# Patient Record
Sex: FEMALE | Race: BLACK OR AFRICAN AMERICAN
[De-identification: names, ages, dates, MRNs, and addresses within clinical notes are randomized per-mention and may not be internally consistent; named-entity substitution may affect disease eponyms.]

---

## 2019-08-12 ENCOUNTER — HOSPITAL ENCOUNTER (EMERGENCY)
Dept: HOSPITAL 62 - ER | Age: 84
Discharge: HOME | End: 2019-08-12
Payer: MEDICARE

## 2019-08-12 VITALS — DIASTOLIC BLOOD PRESSURE: 84 MMHG | SYSTOLIC BLOOD PRESSURE: 189 MMHG

## 2019-08-12 DIAGNOSIS — Z79.899: ICD-10-CM

## 2019-08-12 DIAGNOSIS — Z87.891: ICD-10-CM

## 2019-08-12 DIAGNOSIS — I10: ICD-10-CM

## 2019-08-12 DIAGNOSIS — M54.32: Primary | ICD-10-CM

## 2019-08-12 DIAGNOSIS — M62.81: ICD-10-CM

## 2019-08-12 DIAGNOSIS — Z79.82: ICD-10-CM

## 2019-08-12 LAB
%HYPO/RBC NFR BLD AUTO: (no result) %
ABSOLUTE LYMPHOCYTES# (MANUAL): 1.4 10^3/UL (ref 0.5–4.7)
ABSOLUTE MONOCYTES # (MANUAL): 0.1 10^3/UL (ref 0.1–1.4)
ADD MANUAL DIFF: YES
ALBUMIN SERPL-MCNC: 4.2 G/DL (ref 3.5–5)
ALP SERPL-CCNC: 82 U/L (ref 38–126)
ANION GAP SERPL CALC-SCNC: 10 MMOL/L (ref 5–19)
ANISOCYTOSIS BLD QL SMEAR: (no result)
APPEARANCE UR: CLEAR
APTT BLD: 31.5 SEC (ref 23.5–35.8)
APTT PPP: YELLOW S
AST SERPL-CCNC: 28 U/L (ref 14–36)
BASOPHILS NFR BLD MANUAL: 0 % (ref 0–2)
BILIRUB DIRECT SERPL-MCNC: 0.2 MG/DL (ref 0–0.4)
BILIRUB SERPL-MCNC: 0.6 MG/DL (ref 0.2–1.3)
BILIRUB UR QL STRIP: NEGATIVE
BUN SERPL-MCNC: 13 MG/DL (ref 7–20)
BURR CELLS BLD QL SMEAR: SLIGHT
CALCIUM: 9.5 MG/DL (ref 8.4–10.2)
CHLORIDE SERPL-SCNC: 109 MMOL/L (ref 98–107)
CK MB SERPL-MCNC: 2.66 NG/ML (ref ?–4.55)
CK SERPL-CCNC: 159 U/L (ref 30–135)
CO2 SERPL-SCNC: 24 MMOL/L (ref 22–30)
EOSINOPHIL NFR BLD MANUAL: 5 % (ref 0–6)
ERYTHROCYTE [DISTWIDTH] IN BLOOD BY AUTOMATED COUNT: 16.7 % (ref 11.5–14)
FREE T4 (FREE THYROXINE): 1.51 NG/DL (ref 0.78–2.19)
GLUCOSE SERPL-MCNC: 125 MG/DL (ref 75–110)
GLUCOSE UR STRIP-MCNC: >=500 MG/DL
HCT VFR BLD CALC: 37.3 % (ref 36–47)
HGB BLD-MCNC: 12.1 G/DL (ref 12–15.5)
INR PPP: 1.08
KETONES UR STRIP-MCNC: 20 MG/DL
MCH RBC QN AUTO: 23.7 PG (ref 27–33.4)
MCHC RBC AUTO-ENTMCNC: 32.6 G/DL (ref 32–36)
MCV RBC AUTO: 73 FL (ref 80–97)
MONOCYTES % (MANUAL): 3 % (ref 3–13)
NITRITE UR QL STRIP: NEGATIVE
NT PRO BNP: 418 PG/ML (ref ?–450)
OVALOCYTES BLD QL SMEAR: (no result)
PH UR STRIP: 5 [PH] (ref 5–9)
PLATELET # BLD: 174 10^3/UL (ref 150–450)
PLATELET COMMENT: ADEQUATE
POIKILOCYTOSIS BLD QL SMEAR: (no result)
POTASSIUM SERPL-SCNC: 3.7 MMOL/L (ref 3.6–5)
PROT SERPL-MCNC: 7.8 G/DL (ref 6.3–8.2)
PROT UR STRIP-MCNC: 100 MG/DL
PROTHROMBIN TIME: 14 SEC (ref 11.4–15.4)
RBC # BLD AUTO: 5.13 10^6/UL (ref 3.72–5.28)
SCHISTOCYTES BLD QL SMEAR: (no result)
SEGMENTED NEUTROPHILS % (MAN): 57 % (ref 42–78)
SP GR UR STRIP: 1.03
TOTAL CELLS COUNTED BLD: 100
TROPONIN I SERPL-MCNC: < 0.012 NG/ML
TSH SERPL-ACNC: 0.57 UIU/ML (ref 0.47–4.68)
UROBILINOGEN UR-MCNC: NEGATIVE MG/DL (ref ?–2)
VARIANT LYMPHS NFR BLD MANUAL: 34 % (ref 13–45)
WBC # BLD AUTO: 4.1 10^3/UL (ref 4–10.5)

## 2019-08-12 PROCEDURE — 83880 ASSAY OF NATRIURETIC PEPTIDE: CPT

## 2019-08-12 PROCEDURE — 70450 CT HEAD/BRAIN W/O DYE: CPT

## 2019-08-12 PROCEDURE — 82553 CREATINE MB FRACTION: CPT

## 2019-08-12 PROCEDURE — 85730 THROMBOPLASTIN TIME PARTIAL: CPT

## 2019-08-12 PROCEDURE — 99284 EMERGENCY DEPT VISIT MOD MDM: CPT

## 2019-08-12 PROCEDURE — 85025 COMPLETE CBC W/AUTO DIFF WBC: CPT

## 2019-08-12 PROCEDURE — 80053 COMPREHEN METABOLIC PANEL: CPT

## 2019-08-12 PROCEDURE — 85610 PROTHROMBIN TIME: CPT

## 2019-08-12 PROCEDURE — 82550 ASSAY OF CK (CPK): CPT

## 2019-08-12 PROCEDURE — 96360 HYDRATION IV INFUSION INIT: CPT

## 2019-08-12 PROCEDURE — 84443 ASSAY THYROID STIM HORMONE: CPT

## 2019-08-12 PROCEDURE — 93005 ELECTROCARDIOGRAM TRACING: CPT

## 2019-08-12 PROCEDURE — 71045 X-RAY EXAM CHEST 1 VIEW: CPT

## 2019-08-12 PROCEDURE — 81001 URINALYSIS AUTO W/SCOPE: CPT

## 2019-08-12 PROCEDURE — 84439 ASSAY OF FREE THYROXINE: CPT

## 2019-08-12 PROCEDURE — 84484 ASSAY OF TROPONIN QUANT: CPT

## 2019-08-12 PROCEDURE — 93010 ELECTROCARDIOGRAM REPORT: CPT

## 2019-08-12 PROCEDURE — 82962 GLUCOSE BLOOD TEST: CPT

## 2019-08-12 PROCEDURE — 72131 CT LUMBAR SPINE W/O DYE: CPT

## 2019-08-12 PROCEDURE — 83735 ASSAY OF MAGNESIUM: CPT

## 2019-08-12 PROCEDURE — 36415 COLL VENOUS BLD VENIPUNCTURE: CPT

## 2019-08-12 NOTE — RADIOLOGY REPORT (SQ)
EXAM DESCRIPTION:  CT HEAD WITHOUT



COMPLETED DATE/TIME:  8/12/2019 11:01 am



REASON FOR STUDY:  left sided weakness, ams



COMPARISON:  None.



TECHNIQUE:  Axial images acquired through the brain without intravenous contrast.  Images reviewed wi
th bone, brain and subdural windows.  Additional sagittal and coronal reconstructions were generated.
 Images stored on PACS.

All CT scanners at this facility use dose modulation, iterative reconstruction, and/or weight based d
osing when appropriate to reduce radiation dose to as low as reasonably achievable (ALARA).

[facility]: Dose Right  [facility]: CareDose    [facility]: Dose Right    [facility]: Teradose 4D    [facility]: Smart Technologies



RADIATION DOSE:  CT Rad equipment meets quality standard of care and radiation dose reduction techniq
ues were employed. CTDIvol: 53.2 mGy. DLP: 1097 mGy-cm. mGy.



LIMITATIONS:  None.



FINDINGS:  VENTRICLES: Prominent ventricles secondary to involutional atrophy.

CEREBRUM: Mild cortical atrophy.  No masses.  No hemorrhage.  No midline shift.  No evidence for acut
e infarction. Areas of low density in the white matter most likely chronic small vessel ischemic guy
ges.

CEREBELLUM: No masses.  No hemorrhage.  No alteration of density.  No evidence for acute infarction.

EXTRAAXIAL SPACES: No fluid collections.  No masses.

ORBITS AND GLOBE: No intra- or extraconal masses.  Normal contour of globe without masses.

CALVARIUM: No fracture.

PARANASAL SINUSES: No fluid or mucosal thickening.

SOFT TISSUES: No mass or hematoma.

OTHER: No other significant finding.



IMPRESSION:  Involutional changes with chronic microvascular ischemia.  No acute intracranial imaging
 findings.

EVIDENCE OF ACUTE STROKE: NO.



COMMENT:  Quality ID # 436: Final reports with documentation of one or more dose reduction techniques
 (e.g., Automated exposure control, adjustment of the mA and/or kV according to patient size, use of 
iterative reconstruction technique)



TECHNICAL DOCUMENTATION:  JOB ID:  [id number]

 2011 [employer]- All Rights Reserved



Reading location - IP/workstation name: [email or web address]

## 2019-08-12 NOTE — ER DOCUMENT REPORT
ED Medical Screen (RME)





- General


Chief Complaint: Weakness


Stated Complaint: POSSIBLE STROKE


Time Seen by Provider: 19 09:37


Primary Care Provider: 


MARY DANIELLE MD [Primary Care Provider] - Follow up as needed


Notes: 





HPI: 89-year-old female brought in by her daughters for concern of left-sided 

weakness since  morning, approximately 24 hours ago was her last known 

well time.  He states she had a right-sided lean when she was sitting in her 

chair and she appeared to have some facial droop where she could not drink 

liquids appropriately and it was coming out of her mouth yesterday around 2pm.  

They also state her left leg appears heavy and she is not walking normally and 

walks like she is going to fall over.  No history of this before.  She has not 

been compliant with all of her medications to include her thyroid medication.  

She denies pain anywhere.  No fall or trauma.  She does take a baby aspirin 

daily. no other blood thinners. No prior history of MI or CVA per patient and 

daughter at bedside.  She also has some urinary frequency. no other uti sx. She 

is a diabetic and states her sugars have been running high and her sugar was 340

yesterday when they checked it before giving her insulin.  No other changes in 

medication or diet.  she has not sought care until now.  No other complaints at 

this time. pcp was dr marquez before he . she hasn't really f/u since. 





ROS neg to include 10 systems, unless mentioned in the hpi.





PE:>>>> PHYSICAL_EXAM:


   GENERAL_APPEARANCE: well_nourished, alert, cooperative, no_acute_distress, 

no_obvious_discomfort. pleasant, elderly black female, smiling, speaking in full

sentences, in no sign of pain or resp distress, two adult female daughter at 

bedside 


  VITALS: reviewed, see vital signs table.


  HEAD: no_swelling\tenderness on the head. normocephalic. atraumatic. no evans

signs. no raccoons eyes. 


  EYES: PERRL, EOMI, conjunctiva_clear.


  NOSE: no_nasal_discharge.


  MOUTH: (-)decreased moisture.


  THROAT: no_tonsilar_inflammation, no_airway_obstruction. no_lymphadenopathy. 

no drooling, tripoding, voice change, or stridor. 


  NECK: supple, no_neck_tenderness,  full rom. full strength.  no meningeal 

signs. 


  BACK: no_back_tenderness.


  CHEST_WALL: no_chest_tenderness. no overlying skin changes 


  LUNGS: no_wheezing, ctab  (-)accessory muscle use, good air exchange 

bilateral.


  HEART: normal_rate, normal_rhythm, 


  ABDOMEN: normal_BS, soft, no_abd_tenderness, (-)guarding, (-)rebound, no 

distension or peritoneal signs. no cva ttp


  EXTREMITIES: strength 5/5 in right upper and lower _extremities there is 

slight decreased strength in LUE and LLE, good pulses in all_extremities, 

no_tenderness in the extremities, almost 1+ pitting_edema in lower extremities 

bilat. full rom. gait not assessed. good pulses. brisk cap refill. good hand 

. neg ceasar sign


NEURO: motor and sensation intact, pt has a left facial droop, asymmetrical 

smile. symmetric forehead raise. slight decreased eyelid strength on the left. 

slightly pos pronator drift on the left. 


  SKIN: warm, dry, good_color, no_rash.


  MENTAL_STATUS: speech_clear, normal_affect, responds_appropriately to 

questions.








MDM:





I have ordered labs and initial work-up and patient will be transferred to the 

main ER for further work-up.


 


I have greeted and performed a rapid initial assessment of this patient.  A 

comprehensive ED assessment and evaluation of the patient, analysis of test 

results and completion of medical decision making process will be conducted by 

an additional ED providers.





 Documentation achieved through voice recording which my lead to some occasional

accidental typographical errors. Extensive efforts have been made to proof read 

documentation to make sure these are the least as possible








                                        











  Temp Pulse Resp BP Pulse Ox


 


 19 09:05  98.1 F  84  18  182/82 H  97











                                        











 Category Date Time Status


 


 Accucheck (ED) NOW Care  19 09:49 Ordered


 


 ED Nursing Stroke Protocol NOW Care  19 09:50 Ordered


 


 EKG Documentation STAT Care  19 09:48 Ordered


 


 EKG Documentation STAT Care  19 09:48 Ordered


 


 Head of Bed >30 Degrees (ED) NOW Care  19 09:50 Ordered


 


 MEND Neuro Exam STAT,Q3 Care  19 09:50 Ordered


 


 Monitoring [Continuous Cardiac Monitoring (ED)] NOW Care  19 09:49 

Ordered


 


 Oxygen (ED) Nasal Cannula 2 lpm Care  19 09:50 Ordered


 


 PCT AccuChek Documentation NOW Care  19 09:49 Ordered


 


 Pulse Oximeter Continuous (ED) CONTINUOUS Care  19 09:50 Ordered


 


 Saline Lock (ED) NOW Care  19 09:50 Ordered


 


 CHEST 2 VIEWS [RAD] Stat Exams  19 09:49 Ordered


 


 CT HEAD WITHOUT [CT] Stat Exams  19 09:49 Ordered


 


 CBC WITH DIFF [HEME] Stat Lab  19 09:47 Ordered


 


 COMPREHENSIVE METABOLIC PANEL [CHEM] Stat Lab  19 09:47 Ordered


 


 CREATINE KINASE MB [CHEM] Stat Lab  19 09:48 Ordered


 


 CREATINE KINASE [CHEM] Stat Lab  19 09:48 Ordered


 


 MAGNESIUM [CHEM] Stat Lab  19 09:48 Ordered


 


 NT PRO BNP [CHEM] Stat Lab  19 09:48 Ordered


 


 PARTIAL THROMBOPLASTIN TIME [COAG] Stat Lab  19 09:49 Ordered


 


 PROTHROMBIN TIME/INR [COAG] Stat Lab  19 09:48 Ordered


 


 T4 [FREE T4 (FREE THYROXINE)] [CHEM] Stat Lab  19 09:50 Ordered


 


 THYROID STIMULATING HORMONE [CHEM] Stat Lab  19 09:50 Ordered


 


 TROPONIN I [CHEM] Stat Lab  19 09:48 Ordered


 


 URINALYSIS [URIN] Stat Lab  19 09:48 Uncollected


 


 EKG ER ONLY [ER] Stat Oth  19 Ordered











TRAVEL OUTSIDE OF THE [place] IN LAST 30 DAYS: No





- Related Data


Allergies/Adverse Reactions: 


                                        





No Known Allergies Allergy (Verified 19 09:05)


   











Past Medical History





- Social History


Chew tobacco use (# tins/day): No


Frequency of alcohol use: None


Drug Abuse: None


Renal/ Medical History: Denies: Hx Peritoneal Dialysis





Physical Exam





- Vital signs


Vitals: 





                                        











Temp Pulse Resp BP Pulse Ox


 


 98.1 F   84   18   182/82 H  97 


 


 19 09:05  19 09:05  19 09:05  19 09:05  19 09:05














Course





- Vital Signs


Vital signs: 





                                        











Temp Pulse Resp BP Pulse Ox


 


 98.1 F   84   18   182/82 H  97 


 


 19 09:05  19 09:05  19 09:05  19 09:05  19 09:05














Doctor's Discharge





- Discharge


Referrals: 


MARY DANIELLE MD [Primary Care Provider] - Follow up as needed

## 2019-08-12 NOTE — ER DOCUMENT REPORT
ED General





- General


Chief Complaint: Weakness


Stated Complaint: POSSIBLE STROKE


Time Seen by Provider: 19 09:37


Primary Care Provider: 


MARY DANIELLE MD [NO LOCAL MD] - Follow up as needed


Notes: 





89-year-old female brought in by her daughters for concern of left-sided (left 

leg only) weakness since  morning, approximately 24 hours ago was her last

known well time.  They also state her left leg appears heavy and she is not 

walking normally and walks like she is going to fall over.  No history of this b

efore.  She has not been compliant with all of her medications to include her 

thyroid medication.  She denies pain anywhere.  No fall or trauma.  She does 

take a baby aspirin daily. no other blood thinners. No prior history of MI or 

CVA per patient and daughter at bedside.  She also has some urinary frequency. 

no other uti sx. She is a diabetic and states her sugars have been running high 

and her sugar was 340 yesterday when they checked it before giving her insulin. 

No other changes in medication or diet.  she has not sought care until now.  No 

other complaints at this time. pcp was dr marquez before he . she hasn't 

really f/u since. 





The pt see's Dr. Carnes neurology - he is planning on doing an EMG on her left 

leg due to worsening sciatic nerve issues


TRAVEL OUTSIDE OF THE [place] IN LAST 30 DAYS: No





- Related Data


Allergies/Adverse Reactions: 


                                        





No Known Allergies Allergy (Verified 19 09:05)


   











Past Medical History





- Social History


Smoking Status: Former Smoker


Chew tobacco use (# tins/day): No


Frequency of alcohol use: None


Drug Abuse: None


Family History: Reviewed & Not Pertinent


Patient has suicidal ideation: No


Patient has homicidal ideation: No


Renal/ Medical History: Denies: Hx Peritoneal Dialysis





Review of Systems





- Review of Systems


Constitutional: denies: Chills, Fever


EENT: denies: Double vision, Throat pain


Cardiovascular: denies: Chest pain, Syncope, Dizziness, Edema


Respiratory: denies: Short of breath, Wheezing


Gastrointestinal: denies: Abdominal pain, Diarrhea, Nausea, Vomiting


Genitourinary: denies: Dysuria, Hematuria


Musculoskeletal: Back pain, Other - Left leg pain and weakness


Neurological/Psychological: denies: Headaches, Numbness, Tingling


-: Yes All other systems reviewed and negative





Physical Exam





- Vital signs


Vitals: 


                                        











Temp Pulse Resp BP Pulse Ox


 


 98.1 F   84   18   182/82 H  97 


 


 19 09:05  19 09:05  19 09:05  19 09:05  19 09:05














- Notes


Notes: 





GENERAL_APPEARANCE: well_nourished, alert, cooperative, no_acute_distress, 

no_obvious_discomfort.


 VITALS: reviewed, see vital signs table.


 HEAD: no_swelling\tenderness on the head.


 EYES: PERRL, EOMI, conjunctiva_clear.


 NOSE: no_nasal_discharge.


 MOUTH: (-)decreased moisture.


 THROAT: no_tonsilar_inflammation, no_airway_obstruction. no_lymphadenopathy


 NECK: supple, no_neck_tenderness, (-)thyromegaly.


 BACK: no_back_tenderness.


 CHEST_WALL: no_chest_tenderness.


 LUNGS: no_wheezing, no_rales, no_rhonchi, (-)accessory muscle use, good air 

exchange bilateral.


 HEART: normal_rate, normal_rhythm, normal_S1, normal_S2, (-)S3, (-)S4, 

no_murmur, no_rub.


 ABDOMEN: normal_BS, soft, no_abd_tenderness, (-)guarding, (-)rebound, 

no_organomegaly, no_abd_masses.


 EXTREMITIES: strength 5/5 in all_extremities, good pulses in all_extremities, 

no_swelling\tenderness in the extremities, no_edema.


 SKIN: warm, dry, good_color, no_rash.


 MENTAL_STATUS: speech_clear, oriented_X_3, normal_affect, 

responds_appropriately to questions.


 NEURO: Motor weakness is all intact very slight perceived weakness left lower 

extremity 4/5 neg Sensory Deficits on exam, CN 2-12 intact, DTR 2+ symmetric x 

4, No cerbellar signs





NIH Stroke Scale/Score (NIHSS) 





RESULT SUMMARY:


1 points


NIH Stroke Scale








INPUTS:


1A: Level of consciousness > 0 = Alert; keenly responsive


1B: Ask month and age > 0 = Both questions right


1C: 'Blink eyes' & 'squeeze hands' > 0 = Performs both tasks


2: Horizontal extraocular movements > 0 = Normal


3: Visual fields > 0 = No visual loss


4: Facial palsy > 0 = Normal symmetry


5A: Left arm motor drift > 0 = No drift for 10 seconds


5B: Right arm motor drift > 0 = No drift for 10 seconds


6A: Left leg motor drift > 1 = Drift, but doesn't hit bed


6B: Right leg motor drift > 0 = No drift for 5 seconds


7: Limb Ataxia > 0 = No ataxia


8: Sensation > 0 = Normal; no sensory loss


9: Language/aphasia > 0 = Normal; no aphasia


10: Dysarthria > 0 = Normal


11: Extinction/inattention > 0 = No abnormality














Course





- Re-evaluation


Re-evalutation: 





19 11:14


89-year-old female who was initially brought in through triage and the initial 

complaint was worried as left-sided weakness.  Her last seen normal was Friday. 

The patient buried her daughter that day she was standing out in the heat.  She 

has not quite felt well since that day.  She states that she has chronic left l

eg issues.  She sees a neurologist in [facility] and he is 

planning to do an EMG nerve conduction.  The patient's primary care doctor 

recently passed away and they are arranging her with a new primary care.





On my exam and history the patient's only complaint is her left leg she denies 

any facial numbness tingling weakness.  There was something in the triage note 

about she was drooling with facial droop.  The patient denies this to me and I 

cannot appreciate any of that on exam.  She may be has a slight drift on the 

left.  4 out of 5 strength otherwise there is no motor no sensory deficits her 

stroke scale is 1 just based on the drift of the left leg.





My thought is the patient may have had some mild dehydration due to being out in

the heat on Friday for her daughter's  we will hydrate her with a liter 

of fluid we will scan her head looking for any kind of CNS issue I will scan her

back looking for some kind of significant back issue for sciatica.  Otherwise we

will do lab work check a urine.





19 12:40


Work-up here has been reassuring.  CT scan shows chronic changes noted.  No 

hemorrhages space-occupying lesions or obvious strokes.  Patient's been having 

symptoms since Friday it is noted.  The patient CT scan of the lumbar spine 

shows lots of degenerative changes and some canal stenosis.  All this is chronic

but this may be what is causing the increased sciatica that the patient has.  

Everything thing else looked good really from the history that I did I do not 

believe the patient has had a stroke.  I will speak with the patient and family 

further I think the most important thing here is follow-up with her already 

established neurologist possibly getting MRI of the lumbar spine and then spine 

referral.








19 12:53


Spoke with family they are comfortable going home.  Going to place patient on a 

low-dose blood pressure medicine and something for pain for home again her main 

complaint is pain in her left leg she has no signs of stroke now.  The leg 

weakness I think is from some spinal stenosis that was seen on the lumbar spine 

CT.  She will need MRI and she is already established with neurology at [facility].  She will likely need spine surgery referral or pain management for 

epidural steroid injections.





- Vital Signs


Vital signs: 


                                        











Temp Pulse Resp BP Pulse Ox


 


 98.1 F   84   24 H  181/107 H  98 


 


 19 09:05  19 09:05  19 11:11  19 11:11  19 11:11














- Laboratory


Result Diagrams: 


                                 19 10:07





                                 19 10:07


Laboratory results interpreted by me: 


                                        











  19





  10:06 10:07 10:07


 


MCV   73 L 


 


MCH   23.7 L 


 


RDW   16.7 H 


 


Chloride   


 


Glucose   


 


POC Glucose  118 H  


 


Creatine Kinase    159 H


 


Urine Protein   


 


Urine Glucose (UA)   


 


Urine Ketones   


 


Urine Blood   














  19





  10:07 10:07


 


MCV  


 


MCH  


 


RDW  


 


Chloride   109 H


 


Glucose   125 H


 


POC Glucose  


 


Creatine Kinase  


 


Urine Protein  100 H 


 


Urine Glucose (UA)  >=500 H 


 


Urine Ketones  20 H 


 


Urine Blood  SMALL H 














- Diagnostic Test


Radiology reviewed: Reports reviewed


Radiology results interpreted by me: 





19 12:39





                                        





Chest X-Ray  19 09:49


IMPRESSION:  NO ACUTE RADIOGRAPHIC FINDING IN THE CHEST.


 








Head CT  19 09:49


IMPRESSION:  Involutional changes with chronic microvascular ischemia.  No acute

intracranial imaging findings.


EVIDENCE OF ACUTE STROKE: NO.


 








Lumbar Spine CT  19 11:09


IMPRESSION:  Scoliosis.  Facet arthropathy.  Disc changes as described above.  

Most significant findings are at L3-4 and L4-5 where there is significant 

central canal stenosis as well as foraminal stenoses.


 














- EKG Interpretation by Me


EKG shows normal: Sinus rhythm


Rate: Normal


Rhythm: NSR, PVC's





Discharge





- Discharge


Clinical Impression: 


 Sciatica of left side





Hypertension


Qualifiers:


 Hypertension type: essential hypertension Qualified Code(s): I10 - Essential 

(primary) hypertension





Condition: Good


Disposition: HOME, SELF-CARE


Instructions:  Sciatica ([facility])


Additional Instructions: 


Please follow-up with Dr. Carnes for your symptoms.  You will likely need an MRI

of the lumbar spine and likely referral to a spine specialist.  Dr. Carnes can 

continue with the EMG testing.  Any other symptoms of concern that we have 

discussed return to the nearest ER.


Prescriptions: 


Hydrochlorothiazide [Hydrodiuril 25 mg Tablet] 25 mg PO QAM #30 tablet


Hydrocodone/Acetaminophen [Hydrocodone-Acetamin 5-300 mg] 0.5 each PO TID PRN #

12 tablet


 PRN Reason: Pain Scale Of 5


Referrals: 


MARY DANIELLE MD [NO LOCAL MD] - Follow up as needed

## 2019-08-12 NOTE — RADIOLOGY REPORT (SQ)
EXAM DESCRIPTION:  CHEST SINGLE VIEW



COMPLETED DATE/TIME:  8/12/2019 10:39 am



REASON FOR STUDY:  left sided weakness, ams



COMPARISON:  None.



EXAM PARAMETERS:  NUMBER OF VIEWS: One view.

TECHNIQUE: Single frontal radiographic view of the chest acquired.

RADIATION DOSE: NA

LIMITATIONS: None.



FINDINGS:  LUNGS AND PLEURA: No opacities, masses or pneumothorax. No pleural effusion.

MEDIASTINUM AND HILAR STRUCTURES: No masses.  Contour normal.

HEART AND VASCULAR STRUCTURES: Heart normal in size.  Normal vasculature.

BONES: No acute findings.

HARDWARE: None in the chest.

OTHER: No other significant finding.



IMPRESSION:  NO ACUTE RADIOGRAPHIC FINDING IN THE CHEST.



TECHNICAL DOCUMENTATION:  JOB ID:  [id number]

 2011 [employer]- All Rights Reserved



Reading location - IP/workstation name: [email or web address]

## 2019-08-12 NOTE — RADIOLOGY REPORT (SQ)
EXAM DESCRIPTION:  CT LUMBAR SPINE WITHOUT



COMPLETED DATE/TIME:  8/12/2019 11:48 am



REASON FOR STUDY:  left leg pain sciatica



COMPARISON:  None.



TECHNIQUE:  Axial images acquired through the lumbar spine without intravenous contrast.  Images revi
ewed with lung, soft tissue and bone  windows.  Reconstructed coronal and sagittal MPR images reviewe
d.  All images  stored on PACS.

All CT scanners at this facility use dose modulation, iterative reconstruction, and/or weight based d
osing when appropriate to reduce radiation dose to as low as reasonably achievable (ALARA).

[facility]: Dose Right  [facility]: CareDose    [facility]: Dose Right    [facility]: Teradose 4D    [facility]: [employer] Technologies



RADIATION DOSE:   mGy.



LIMITATIONS:  None.



FINDINGS:  SEGMENTATION: Normal.   No transitional anatomy.

ALIGNMENT: Dextroscoliosis.

VERTEBRAL BODIES: No  fractures.  No dislocation.  No acute findings.

DISCS: All the lumbar disc spaces are narrowed.  Vacuum disc phenomenon is seen in all the lumbar dis
c except at L5-S1.

L1-L2: Facet hypertrophy on the left that results in mild left foraminal stenosis.  No significant di
sc pathology.

L2-L3: Mild concentric disc bulging.  Hypertrophic facet changes on the left.  Minimal left foraminal
 stenosis.  No significant central canal stenosis.

L3-L4: Disc bulging to the left.  This primarily lateral but also involves the left neural foramen.  
Hypertrophic facet changes and ligament hypertrophy result in significant central canal stenosis as w
ell.

L4-L5: Mild concentric disc bulging.  Facet and ligament hypertrophy.  Significant central canal sten
osis.  The disc may contact the exiting nerve root on the left outside of the neural foramen.

L5-S1: No significant protrusions.  No significant stenosis.

PEDICLES, TRANSVERSE PROCESSES: No  fractures.  No dislocation.  No acute findings.

FACETS, POSTERIOR ELEMENTS: Hypertrophic facet changes at multiple levels.

HARDWARE: None in the spine.

VISUALIZED RIBS: No  fractures.

SOFT TISSUES: No  significant or acute finding in adjacent soft tissues.

OTHER: No  other significant finding.



IMPRESSION:  Scoliosis.  Facet arthropathy.  Disc changes as described above.  Most significant findi
ngs are at L3-4 and L4-5 where there is significant central canal stenosis as well as foraminal steno
ses.



TECHNICAL DOCUMENTATION:  JOB ID:  [id number]

Quality ID # 436: Final reports with documentation of one or more dose reduction techniques (e.g., Au
tomated exposure control, adjustment of the mA and/or kV according to patient size, use of iterative 
reconstruction technique)

 2011 [employer]- All Rights Reserved



Reading location - IP/workstation name: [email or web address]

## 2019-08-13 NOTE — EKG REPORT
SEVERITY:- ABNORMAL ECG -

SINUS RHYTHM

VENTRICULAR TRIGEMINY

FIRST DEGREE AV BLOCK

PROBABLE LEFT ATRIAL ABNORMALITY

LEFT AXIS DEVIATION

LEFT VENTRICULAR HYPERTROPHY

:

Confirmed by: Nneka Marks 13-Aug-2019 09:33:58

## 2019-12-28 ENCOUNTER — HOSPITAL ENCOUNTER (EMERGENCY)
Dept: HOSPITAL 62 - ER | Age: 84
LOS: 1 days | Discharge: HOME | End: 2019-12-29
Payer: MEDICARE

## 2019-12-28 DIAGNOSIS — M54.42: Primary | ICD-10-CM

## 2019-12-28 DIAGNOSIS — G89.29: ICD-10-CM

## 2019-12-28 DIAGNOSIS — M25.552: ICD-10-CM

## 2019-12-28 DIAGNOSIS — Z79.899: ICD-10-CM

## 2019-12-28 DIAGNOSIS — E11.9: ICD-10-CM

## 2019-12-28 PROCEDURE — 99283 EMERGENCY DEPT VISIT LOW MDM: CPT

## 2019-12-28 PROCEDURE — 72110 X-RAY EXAM L-2 SPINE 4/>VWS: CPT

## 2019-12-28 PROCEDURE — 96372 THER/PROPH/DIAG INJ SC/IM: CPT

## 2019-12-28 PROCEDURE — 73502 X-RAY EXAM HIP UNI 2-3 VIEWS: CPT

## 2019-12-29 VITALS — SYSTOLIC BLOOD PRESSURE: 173 MMHG | DIASTOLIC BLOOD PRESSURE: 69 MMHG

## 2019-12-29 NOTE — ER DOCUMENT REPORT
ED Medical Screen (RME)





- General


Chief Complaint: Hip Pain


Stated Complaint: LEFT HIP PAIN/FALL


Time Seen by Provider: 12/29/19 01:03


Primary Care Provider: 


LUIS ARMANDO TO MD [Primary Care Provider] - Follow up as needed


Mode of Arrival: Wheelchair


Information source: Patient


Notes: 





89-year-old female presented to ED for complaint of severe pain to her left hip 

and low back.  She has a history of low back pain and does seen her primary care

and pain management but she fell landing on her buttocks on Christmas morning 

stated she was able to get up at that time but is slowly the pain is increased 

to the point where she is not able to put any weight on her left hip.  Patient 

is alert oriented respirations regular nonlabored.  She is in a wheelchair at 

this time.











I have greeted and performed a rapid initial assessment of this patient.  A 

comprehensive ED assessment and evaluation of the patient, analysis of test 

results and completion of medical decision making process will be conducted by 

an additional ED providers.


TRAVEL OUTSIDE OF THE [place] IN LAST 30 DAYS: No





- Related Data


Allergies/Adverse Reactions: 


                                        





No Known Allergies Allergy (Verified 08/12/19 09:05)


   











Past Medical History





- Social History


Chew tobacco use (# tins/day): No


Frequency of alcohol use: None


Drug Abuse: None


Endocrine Medical History: Reports: Hx Diabetes Mellitus Type 2


Renal/ Medical History: Denies: Hx Peritoneal Dialysis





Physical Exam





- Vital signs


Vitals: 





                                        











Temp Pulse Resp BP Pulse Ox


 


 98.3 F   83   24 H  189/89 H  95 


 


 12/28/19 22:48  12/28/19 22:48  12/28/19 22:48  12/28/19 22:48  12/28/19 22:48














Course





- Vital Signs


Vital signs: 





                                        











Temp Pulse Resp BP Pulse Ox


 


 98.0 F   78   24 H  158/102 H  98 


 


 12/29/19 00:54  12/29/19 00:54  12/28/19 22:48  12/29/19 00:54  12/29/19 00:54














Doctor's Discharge





- Discharge


Referrals: 


LUIS ARMANDO TO MD [Primary Care Provider] - Follow up as needed

## 2019-12-29 NOTE — RADIOLOGY REPORT (SQ)
EXAM DESCRIPTION: 



XR LUMBAR SPINE ANTEROPOSTERIOR, LATERAL, AND OBLIQUES



COMPLETED DATE/TME:  12/29/2019 01:05



CLINICAL HISTORY: 



89 years, Female, fall hx of back pain



COMPARISON:

8/12/2019





FINDINGS:



4 views of the lumbar spine. Dextroconvex scoliosis of the lumbar

spine. Osteopenia. 5 nonrib-bearing lumbar vertebrae. Pedicles

identified throughout. Lumbar vertebral body height preserved.

Mild multilevel loss of intervertebral disc height with endplate

spondylosis and facet arthropathy. No acute abnormalities of the

sacrum. Atherosclerotic vascular calcification. Pessary in the

pelvis.



IMPRESSION:





1. No acute abnormality of the lumbar spine by plain film

criteria.



2. Degenerative change of the lumbar spine with dextroconvex

scoliosis.

 



copyright 2011 [employer]- All Rights Reserved

## 2019-12-29 NOTE — RADIOLOGY REPORT (SQ)
EXAM DESCRIPTION: 



XR HIP 2 OR MORE VIEWS



COMPLETED DATE/TME:  12/29/2019 01:04



CLINICAL HISTORY: 



89 years, Female, pain since fell on cullen morning



COMPARISON:

None.



FINDINGS:



Single view of the pelvis and lateral view of the left hip. No

acute fracture. Osteopenia. Mild bilateral hip joint space

narrowing, greater on the right than the left. Pessary

identified. Atherosclerotic vascular calcification. Degenerative

changes of visualized spine.



IMPRESSION:



1. No left hip fracture identified.

 



copyright 2011 [employer]- All Rights Reserved

## 2019-12-29 NOTE — ER DOCUMENT REPORT
ED Hip Pain/Injury





- General


Chief Complaint: Hip Pain


Stated Complaint: LEFT HIP PAIN/FALL


Time Seen by Provider: 12/29/19 01:03


Primary Care Provider: 


LUIS ARMANDO TO MD [Primary Care Provider] - Follow up as needed


Mode of Arrival: Wheelchair


TRAVEL OUTSIDE OF THE [place] IN LAST 30 DAYS: No





- HPI


Patient complains to provider of: Pain, Hip


Occurred: Other - Chronic back pain left hip pain with history of sciatica.  

Patient is followed in a pain clinic and has received steroid injections in her 

back.  Currently she is only taking Tylenol 1000 mg twice a day without any 

relief.  There is been no new injury or trauma.


Onset/Duration: Constant, Worse


Quality of pain: Sharp


Severity: Severe


Pain Level: 5


Context: No trauma


Skin Color: Normal


Skin Temperature: Warm


Rotation of extremity: None


Pain with palpation of the pelvis: Yes - Pain is posterior hip and lateral down 

leg to the back of the knee


Associated Symptoms: None





- Related Data


Allergies/Adverse Reactions: 


                                        





No Known Allergies Allergy (Verified 08/12/19 09:05)


   











Past Medical History





- General


Information source: Patient





- Social History


Smoking Status: Never Smoker


Chew tobacco use (# tins/day): No


Frequency of alcohol use: None


Drug Abuse: None


Family History: Reviewed & Not Pertinent


Patient has suicidal ideation: No


Patient has homicidal ideation: No


Endocrine Medical History: Reports: Hx Diabetes Mellitus Type 2


Renal/ Medical History: Denies: Hx Peritoneal Dialysis


Past Surgical History: Reports: Hx Orthopedic Surgery - BILAT TKA





Review of Systems





- Review of Systems


Musculoskeletal: See HPI, Joint pain, Other - Sciatica





Physical Exam





- Vital signs


Vitals: 


                                        











Temp Pulse Resp BP Pulse Ox


 


 98.3 F   83   24 H  189/89 H  95 


 


 12/28/19 22:48  12/28/19 22:48  12/28/19 22:48  12/28/19 22:48  12/28/19 22:48











Interpretation: Normal





- Notes


Notes: 





Patient is in pain in her left hip and left leg radiating down to the back of 

her knee





- General


General appearance: Appears well, Alert





- HEENT


Head: Normocephalic, Atraumatic


Eyes: Normal


Pupils: PERRL





- Respiratory


Respiratory status: No respiratory distress


Chest status: Nontender


Breath sounds: Normal


Chest palpation: Normal





- Cardiovascular


Rhythm: Regular


Heart sounds: Normal auscultation


Murmur: No





- Abdominal


Inspection: Normal


Distension: No distension


Bowel sounds: Normal


Tenderness: Nontender


Organomegaly: No organomegaly





- Back


Back: Normal, Nontender





- Extremities


General upper extremity: Normal inspection, Nontender, Normal color, Normal ROM,

Normal temperature


General lower extremity: Normal inspection, Nontender, Normal color, Normal ROM,

Normal temperature, Normal weight bearing.  No: Ramiro's sign





- Neurological


Neuro grossly intact: Yes


Cognition: Normal


Orientation: AAOx4


[place] Coma Scale Eye Opening: Spontaneous


[place] Coma Scale Verbal: Oriented


[place] Coma Scale Motor: Obeys Commands


[place] Coma Scale Total: 15


Speech: Normal


Motor strength normal: LUE, RUE, LLE, RLE


Sensory: Normal





- Psychological


Associated symptoms: Normal affect, Normal mood





- Skin


Skin Temperature: Warm


Skin Moisture: Dry


Skin Color: Normal





Course





- Vital Signs


Vital signs: 


                                        











Temp Pulse Resp BP Pulse Ox


 


 97.6 F   73   18   173/69 H  98 


 


 12/29/19 05:21  12/29/19 05:21  12/29/19 05:21  12/29/19 05:21  12/29/19 05:21














Discharge





- Discharge


Clinical Impression: 


 Sciatica of left side associated with disorder of lumbar spine





Condition: Fair


Disposition: HOME, SELF-CARE


Additional Instructions: 


Sciatica





     Your symptoms suggest "sciatica."  The pain of sciatica typically radiates 

down the leg.  Numbness in the foot or calf may also occur. Sciatica is caused 

by irritation of the sciatic nerve or its branches. The irritation can be due to

a herniated disk in the spine, swelling and inflammation in the muscles 

surrounding the sciatic nerve, or direct injury of the nerve itself.


     Most cases of sciatica will resolve with medical treatment. Bed rest is 

usually recommended initially.  Surgery is only necessary when the condition 

will not improve with rest and antiinflammatory medication.  Muscle relaxers are

often given if muscle soreness is present.


     A CAT scan of the back may be performed if a herniated disk is suspected.


     Re-examination is necessary if you develop increasing numbness, localized 

weakness in the foot or ankle, or if the pain does not respond to rest.





Prescriptions: 


Methylprednisolone [Medrol Dosepack (4 mg/Tab) 21 Tab/Dosepak] 4 mg PO ASDIR PRN

#21 tab.ds.pk


 PRN Reason: 


Forms:  Elevated Blood Pressure


Referrals: 


LUIS ARMANDO TO MD [Primary Care Provider] - Follow up as needed

## 2020-01-03 ENCOUNTER — HOSPITAL ENCOUNTER (EMERGENCY)
Dept: HOSPITAL 62 - ER | Age: 85
End: 2020-01-03
Payer: MEDICARE

## 2020-01-03 VITALS — DIASTOLIC BLOOD PRESSURE: 69 MMHG | SYSTOLIC BLOOD PRESSURE: 89 MMHG

## 2020-01-03 DIAGNOSIS — R55: ICD-10-CM

## 2020-01-03 DIAGNOSIS — G89.29: ICD-10-CM

## 2020-01-03 DIAGNOSIS — M54.9: ICD-10-CM

## 2020-01-03 DIAGNOSIS — I10: ICD-10-CM

## 2020-01-03 DIAGNOSIS — I47.0: ICD-10-CM

## 2020-01-03 DIAGNOSIS — I46.9: Primary | ICD-10-CM

## 2020-01-03 DIAGNOSIS — E11.9: ICD-10-CM

## 2020-01-03 PROCEDURE — 0BH17EZ INSERTION OF ENDOTRACHEAL AIRWAY INTO TRACHEA, VIA NATURAL OR ARTIFICIAL OPENING: ICD-10-PCS

## 2020-01-03 PROCEDURE — 82962 GLUCOSE BLOOD TEST: CPT

## 2020-01-03 PROCEDURE — 96375 TX/PRO/DX INJ NEW DRUG ADDON: CPT

## 2020-01-03 PROCEDURE — 92950 HEART/LUNG RESUSCITATION CPR: CPT

## 2020-01-03 PROCEDURE — 94660 CPAP INITIATION&MGMT: CPT

## 2020-01-03 PROCEDURE — 99291 CRITICAL CARE FIRST HOUR: CPT

## 2020-01-03 PROCEDURE — 31500 INSERT EMERGENCY AIRWAY: CPT

## 2020-01-03 PROCEDURE — 99292 CRITICAL CARE ADDL 30 MIN: CPT

## 2020-01-03 PROCEDURE — 96374 THER/PROPH/DIAG INJ IV PUSH: CPT

## 2020-01-03 NOTE — ER DOCUMENT REPORT
ED General





- General


Chief Complaint: possible syncope


Stated Complaint: SYNCOPE EPISODE


Primary Care Provider: 


LUIS ARMANDO TO MD [Primary Care Provider] - Follow up as needed


TRAVEL OUTSIDE OF THE [place] IN LAST 30 DAYS: No





- Related Data


Allergies/Adverse Reactions: 


                                        





No Known Allergies Allergy (Verified 19 09:05)


   











Past Medical History





- Social History


Smoking Status: Unknown if Ever Smoked


Family History: Reviewed & Not Pertinent


Patient has suicidal ideation: No


Patient has homicidal ideation: No


Endocrine Medical History: Reports: Hx Diabetes Mellitus Type 2


Renal/ Medical History: Denies: Hx Peritoneal Dialysis


Past Surgical History: Reports: Hx Orthopedic Surgery - BILAT TKA





Physical Exam





- Vital signs


Vitals: 


                                        











Pulse Ox


 


 90 L


 


 20 15:32














- Notes


Notes: 





Patient was brought in by paramedics after syncopal episode at home.  According 

to family she was sitting on the toilet so she felt lightheaded and slumped 

over.  She did not actually fall off the toilet.  Remembers helped her up until 

paramedics arrived she was transported to the hospital.  There is no seizure 

activity noted at this time and family members indicate she was not complaining 

of any chest pain shortness of breath or headache.  And she was fine this 

morning.  Upon arrival in emergency department nursing staff indicates that she 

was alert and oriented x4 answering questions appropriately with vital signs but

was complaining of some mild shortness of breath is on supplemental O2.





Past medical history admitted for chronic back pain hypertension





Social history and review of systems unobtainable at this time





1


PHYSICIAN EXAM -vital signs are noted triage note and note from triage reviewed


 


GENERAL: General appearance the patient is chronically ill


 


HEAD: Atraumatic, normocephalic.


 


EYES: Pupils midrange and very sluggish.  She had very minimal oculocephalic 

reflex


 


ENT: nares patent, oropharynx clear 


 


NECK: No step-offs


 


LUNGS: No breath sounds


 


HEART: No heart sounds


 


ABDOMEN: No masses obese


 


EXTREMITIES: No deformity, +1 edema bilaterally to the mid calf and appreciate 

any cords


 


NEUROLOGICAL: Unresponsive


 


PSYCH: unAble to test.


 


SKIN: Cool and dry





BACK-no step-offs








Shoulder to right ribs called to the bedside as he became apneic and went to the

cardiac arrest.  She had no pulses and was apneic.  CPR was started.  CPR 

patient started to wake up slightly and grimaced a little bit and CPR was 

stopped.  She did have a palpable pulse.  She underwent rapid sequence induction

with etomidate lidocaine and rocuronium and was intubated with a 7.5 

endotracheal tube with good breath sounds.  2 IVs were started shortly after 

this patient went into cardiac arrest again CPR was started.  Did ACLS protocols

were followed w case uns of SHANNA madrigal treated with amiodarone


During the time I reviewed the rhythm strip first arrived.  She appears to have 

some questionable ST elevation versus some peak T waves.  There is also a run of

ventricular tachycardia because of concerns for possible high peak hyperkalemia 

she was given calcium chloride as well as a dose of magnesium she continued to 

have brief episodes of a pulse but never long enough to obtain a blood pressure.

 Occasions when she started to bradycardia down she was given atropine with no 

change.  Ultrasound was applied which showed some very mild contractility but no

significant pleural effusion.  Continues to have good breath sounds so do not 

suspect a pneumothorax.  Does have a history of leg pain and swelling there is 

concern for PE since he was in a PEA type rhythm and consider TPA contact the 

pharmacy for same.  I went and talked to the family initially on arrival to get 

a brief history and then later to update them on patient's rather grave 

condition.  Talk to him about living will patient did not have any requested 

everything be done.  This patient still continues to have intermittent cardiac 

activity and would go into a PEA rhythm we did give the patient TPA.  CPR was 

continued.  Prior to TPA it was noted that she was having occasional blood from 

the endotracheal tube that was suctioned.  This time she was also given IV 

fluids consider a tension pneumothorax which she had good breath sounds 

bilaterally did not feel that your thoracostomy was indicated.  See I did not 

see any evident any evidence of a large effusion cardiac tamponade seem to be 

unlikely.  At this point we did benefit a resuscitation for about an hour and 15

minutes and is felt that additional times a resuscitation would be futile.  

Point the code was called rhythm at that time was agonal had no breath sounds 

and no heart sounds family was advised this time is 7 and no indication for a 

medical examiner case.  During this time CPR was supervised by me





Course





- Re-evaluation


Re-evalutation: 





20 00:45


Of note patient was never stable enough obtain EKG and labs of not retained 

initially on arrival





- Vital Signs


Vital signs: 


                                        











Temp Pulse Resp BP Pulse Ox


 


       22 H  89/69 L  100 


 


       20 16:52  20 16:52  20 16:43














Procedures





- Intubation


  ** Orotracheal


Airway evaluation: Normal anatomy


Mallampati Classification: Class 3


Medications: Lidocaine, Etomidate, Vecuronium


Intubation method: Orotracheal


Blade type: Other


Equipment used: Glidescope


ETT size: 7.5


Breath Sounds after Intubation: Equal


End tidal CO2 confirmed: Yes - Good breath sounds bilaterally


Intubation Complications: No complications





- Ultrasound/Bedside


  ** Ultrasound/Bedside


Notes: 





20 00:48


Informal bedside ultrasound was performed by me.  The subxiphoid approach was 

used.  Was able to visualize minimal cardiac activity with no evidence of large 

effusion





Critical Care Note





- Critical Care Note


Total time excluding time spent on procedures (mins): 75


Comments: 





This did not include any time spent on billable procedures





Discharge





- Discharge


Clinical Impression: 


 Cardiac arrest, Ventricular arrhythmia





Syncope


Qualifiers:


 Syncope type: unspecified Qualified Code(s): R55 - Syncope and collapse





Disposition: 


Referrals: 


LUIS ARMANDO TO MD [Primary Care Provider] - Follow up as needed